# Patient Record
Sex: MALE | Employment: UNEMPLOYED | URBAN - METROPOLITAN AREA
[De-identification: names, ages, dates, MRNs, and addresses within clinical notes are randomized per-mention and may not be internally consistent; named-entity substitution may affect disease eponyms.]

---

## 2017-10-26 ENCOUNTER — TELEPHONE (OUTPATIENT)
Dept: ORTHOPEDICS | Facility: CLINIC | Age: 22
End: 2017-10-26

## 2017-10-26 DIAGNOSIS — D48.119 DESMOID TUMOR: Primary | ICD-10-CM

## 2017-10-26 DIAGNOSIS — D49.2 LUMBAR SPINE TUMOR: ICD-10-CM

## 2017-10-26 NOTE — TELEPHONE ENCOUNTER
FU appt.  With Dr. Smyth made for 12/21/17.  He will have an MRI before the appt.  Orders put in and scheduled.

## 2017-11-12 ENCOUNTER — HEALTH MAINTENANCE LETTER (OUTPATIENT)
Age: 22
End: 2017-11-12

## 2017-12-21 ENCOUNTER — OFFICE VISIT (OUTPATIENT)
Dept: ORTHOPEDICS | Facility: CLINIC | Age: 22
End: 2017-12-21
Payer: COMMERCIAL

## 2017-12-21 ENCOUNTER — RADIANT APPOINTMENT (OUTPATIENT)
Dept: MRI IMAGING | Facility: CLINIC | Age: 22
End: 2017-12-21
Attending: ORTHOPAEDIC SURGERY
Payer: COMMERCIAL

## 2017-12-21 DIAGNOSIS — D49.2 LUMBAR SPINE TUMOR: ICD-10-CM

## 2017-12-21 DIAGNOSIS — D48.119 DESMOID TUMOR: Primary | ICD-10-CM

## 2017-12-21 DIAGNOSIS — D48.119 DESMOID TUMOR: ICD-10-CM

## 2017-12-21 RX ORDER — GADOBUTROL 604.72 MG/ML
7.5 INJECTION INTRAVENOUS ONCE
Status: COMPLETED | OUTPATIENT
Start: 2017-12-21 | End: 2017-12-21

## 2017-12-21 RX ADMIN — GADOBUTROL 7.5 ML: 604.72 INJECTION INTRAVENOUS at 13:20

## 2017-12-21 ASSESSMENT — ENCOUNTER SYMPTOMS
RECTAL PAIN: 0
HEARTBURN: 0
BLOOD IN STOOL: 0
JAUNDICE: 0
ABDOMINAL PAIN: 0
VOMITING: 1
CONSTIPATION: 0
NAUSEA: 1
DIARRHEA: 0
BLOATING: 0
BOWEL INCONTINENCE: 0

## 2017-12-21 NOTE — PROGRESS NOTES
Orthopaedic Oncology and Adult Reconstructive (joint replacement) Surgery   Clinic visit -  Samir Smyth M.D.    DIAGNOSIS:  Desmoid tumor, L5-S1 level.    PROCEDURE:  November 2011, CT-guided biopsy of the tumor.       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  Surgical excision was deferred due to the morbidity involved.  He has not been undergoing any medical therapy either.  He is completely asymptomatic.      PHYSICAL EXAMINATION:  His gait is normal.  Neurological examination reveals he is able to walk on his toes and heels and perform single leg stance without difficulty.      IMAGING:  MRI examination is obtained and compared to previous studies from 8/8/16 and 2011.  Over the 7-year duration, there has been no appreciable change in the size, position or shape of his tumor.      IMPRESSION:  Stable indolent desmoid fibromatosis tumor without evidence of progressive growth.      I do not believe any treatment intervention is warranted at the present time as he is completely asymptomatic and there is no active growth of the tumor.  I have asked him to return in 5 years or sooner if problems.    Samir Smyth MD  [facility] Family Professor  Oncology and Adult Reconstructive Surgery  Dept Orthopaedic Surgery, [facility]  [phone] office, [phone] pager  [email or web address]    Total Time = 15 min, 50% of which was spent in counseling and coordination of care as documented above.      Answers for HPI/ROS submitted by the patient on 12/21/2017   General Symptoms: No  Skin Symptoms: No  HENT Symptoms: No  EYE SYMPTOMS: No  HEART SYMPTOMS: No  LUNG SYMPTOMS: No  INTESTINAL SYMPTOMS: Yes  URINARY SYMPTOMS: No  REPRODUCTIVE SYMPTOMS: No  SKELETAL SYMPTOMS: No  BLOOD SYMPTOMS: No  NERVOUS SYSTEM SYMPTOMS: No  MENTAL HEALTH SYMPTOMS: No  Heart burn or indigestion: No  Nausea: Yes  Vomiting: Yes  Abdominal  pain: No  Bloating: No  Constipation: No  Diarrhea: No  Blood in stool: No  Black stools: No  Rectal or Anal pain: No  Fecal incontinence: No  Yellowing of skin or eyes: No  Vomit with blood: No  Change in stools: No

## 2017-12-21 NOTE — LETTER
12/21/2017       RE: Andrés Britt  [id number]   [place] [address]     Dear Colleague,    Thank you for referring your patient, Andrés Britt, to the [facility] at [facility]. Please see a copy of my visit note below.        Orthopaedic Oncology and Adult Reconstructive (joint replacement) Surgery   Clinic visit -  Samir Smyth M.D.    DIAGNOSIS:  Desmoid tumor, L5-S1 level.    PROCEDURE:  November 2011, CT-guided biopsy of the tumor.       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  Surgical excision was deferred due to the morbidity involved.  He has not been undergoing any medical therapy either.  He is completely asymptomatic.      PHYSICAL EXAMINATION:  His gait is normal.  Neurological examination reveals he is able to walk on his toes and heels and perform single leg stance without difficulty.      IMAGING:  MRI examination is obtained and compared to previous studies from 8/8/16 and 2011.  Over the 7-year duration, there has been no appreciable change in the size, position or shape of his tumor.      IMPRESSION:  Stable indolent desmoid fibromatosis tumor without evidence of progressive growth.      I do not believe any treatment intervention is warranted at the present time as he is completely asymptomatic and there is no active growth of the tumor.  I have asked him to return in 5 years or sooner if problems.    Answers for HPI/ROS submitted by the patient on 12/21/2017   General Symptoms: No  Skin Symptoms: No  HENT Symptoms: No  EYE SYMPTOMS: No  HEART SYMPTOMS: No  LUNG SYMPTOMS: No  INTESTINAL SYMPTOMS: Yes  URINARY SYMPTOMS: No  REPRODUCTIVE SYMPTOMS: No  SKELETAL SYMPTOMS: No  BLOOD SYMPTOMS: No  NERVOUS SYSTEM SYMPTOMS: No  MENTAL HEALTH SYMPTOMS: No  Heart burn or indigestion: No  Nausea: Yes  Vomiting: Yes  Abdominal pain: No  Bloating:  No  Constipation: No  Diarrhea: No  Blood in stool: No  Black stools: No  Rectal or Anal pain: No  Fecal incontinence: No  Yellowing of skin or eyes: No  Vomit with blood: No  Change in stools: No      Samir Smyth MD  [facility] Family Professor  Oncology and Adult Reconstructive Surgery  Dept Orthopaedic Surgery, [facility]  [phone] office, [phone] pager  [email or web address]    Total Time = 15 min, 50% of which was spent in counseling and coordination of care as documented above.

## 2017-12-21 NOTE — DISCHARGE INSTRUCTIONS
MRI Contrast Discharge Instructions    The IV contrast you received today will pass out of your body in your  urine. This will happen in the next 24 hours. You will not feel this process.  Your urine will not change color.    Drink at least 4 extra glasses of water or juice today (unless your doctor  has restricted your fluids). This reduces the stress on your kidneys.  You may take your regular medicines.    If you are on dialysis: It is best to have dialysis today.    If you have a reaction: Most reactions happen right away. If you have  any new symptoms after leaving the hospital (such as hives or swelling),  call your hospital at the correct number below. Or call your family doctor.  If you have breathing distress or wheezing, call 911.    Special instructions: ***    I have read and understand the above information.    Signature:______________________________________ Date:___________    Staff:__________________________________________ Date:___________     Time:__________    [place] [facility] Departments:    ___[place]: [phone]  ___[facility]: [phone]  ___[place]: [phone] ___[facility]: [phone]  ___[facility] [place]: [phone]  ___[facility]: [phone]  ___[phone]  ___[facility]: [phone]  ___[phone]

## 2017-12-21 NOTE — MR AVS SNAPSHOT
After Visit Summary   12/21/2017    Andrés Britt    MRN: [id number]           Patient Information     Date Of Birth          1995        Visit Information        Provider Department      12/21/2017 2:00 PM Samir Smyth MD [facility]        Today's Diagnoses     Desmoid tumor    -  1       Follow-ups after your visit        Who to contact     Please call your clinic at [phone] to:    Ask questions about your health    Make or cancel appointments    Discuss your medicines    Learn about your test results    Speak to your doctor   If you have compliments or concerns about an experience at your clinic, or if you wish to file a complaint, please contact [facility] Patient Relations at [phone] or email us at [email or web address]         Additional Information About Your Visit        MyChart Information     [facility] gives you secure access to your electronic health record. If you see a primary care provider, you can also send messages to your care team and make appointments. If you have questions, please call your primary care clinic.  If you do not have a primary care provider, please call [phone] and they will assist you.      [facility] is an electronic gateway that provides easy, online access to your medical records. With [facility], you can request a clinic appointment, read your test results, renew a prescription or communicate with your care team.     To access your existing account, please contact your [facility] Clinic or call [phone] for assistance.        Care EveryWhere ID     This is your Care EveryWhere ID. This could be used by other organizations to access your [place] medical records  [id number]         Blood Pressure from Last 3 Encounters:   11/18/11 112/57    Weight from Last 3 Encounters:   08/10/15 81.6 kg (180 lb) (81 %)*   08/14/14 77.1 kg (170 lb) (75 %)*   11/18/11 67.6 kg (149  lb 0.5 oz) (72 %)*     * Growth percentiles are based on [facility] 2-20 Years data.              Today, you had the following     No orders found for display       Primary Care Provider Office Phone # Fax #    Reuben Pichardo [phone]       [facility]  [address]  [facility] [address]        Equal Access to Services     [facility]: Hadii aad ku hadasho Soomaali, waaxda luqadaha, qaybta kaalmada adeegyada, waxay idiin hayaan adeeg kharash la'aan . So [facility] [phone].    ATENCIÓN: Si habla español, tiene a moore disposición servicios gratuitos de asistencia lingüística. Llame al [phone].    We comply with applicable federal civil rights laws and [place] laws. We do not discriminate on the basis of race, color, national origin, age, disability, sex, sexual orientation, or gender identity.            Thank you!     Thank you for choosing [facility]  for your care. Our goal is always to provide you with excellent care. Hearing back from our patients is one way we can continue to improve our services. Please take a few minutes to complete the written survey that you may receive in the mail after your visit with us. Thank you!             Your Updated Medication List - Protect others around you: Learn how to safely use, store and throw away your medicines at www.disposemymeds.org.          This list is accurate as of: 12/21/17  3:02 PM.  Always use your most recent med list.                   Brand Name Dispense Instructions for use Diagnosis    NO ACTIVE MEDICATIONS

## 2017-12-21 NOTE — NURSING NOTE
Chief Complaint   Patient presents with     Results     5yr F/u with Same day MRI to [facility]. Desmoid Tumor of Lumbar Spine        22 year old  1995        [facility] [id number] [place] [address] [address] AT [facility]    No Known Allergies  Current Outpatient Prescriptions   Medication     NO ACTIVE MEDICATIONS     No current facility-administered medications for this visit.              Payton Martell C.M.A.

## 2020-03-02 ENCOUNTER — HEALTH MAINTENANCE LETTER (OUTPATIENT)
Age: 25
End: 2020-03-02

## 2020-12-14 ENCOUNTER — HEALTH MAINTENANCE LETTER (OUTPATIENT)
Age: 25
End: 2020-12-14

## 2021-04-18 ENCOUNTER — HEALTH MAINTENANCE LETTER (OUTPATIENT)
Age: 26
End: 2021-04-18

## 2021-10-02 ENCOUNTER — HEALTH MAINTENANCE LETTER (OUTPATIENT)
Age: 26
End: 2021-10-02

## 2022-05-14 ENCOUNTER — HEALTH MAINTENANCE LETTER (OUTPATIENT)
Age: 27
End: 2022-05-14

## 2022-09-03 ENCOUNTER — HEALTH MAINTENANCE LETTER (OUTPATIENT)
Age: 27
End: 2022-09-03

## 2023-06-03 ENCOUNTER — HEALTH MAINTENANCE LETTER (OUTPATIENT)
Age: 28
End: 2023-06-03